# Patient Record
Sex: MALE | Race: BLACK OR AFRICAN AMERICAN | NOT HISPANIC OR LATINO | URBAN - METROPOLITAN AREA
[De-identification: names, ages, dates, MRNs, and addresses within clinical notes are randomized per-mention and may not be internally consistent; named-entity substitution may affect disease eponyms.]

---

## 2019-07-26 PROBLEM — I48.91 NEW ONSET ATRIAL FIBRILLATION: Status: ACTIVE | Noted: 2019-07-26

## 2019-07-26 PROBLEM — Z99.2 HEMODIALYSIS ACCESS, AV GRAFT: Status: ACTIVE | Noted: 2019-07-26

## 2019-07-26 PROBLEM — Z95.3 HISTORY OF MITRAL VALVE REPLACEMENT WITH BIOPROSTHETIC VALVE: Status: ACTIVE | Noted: 2019-07-26

## 2019-07-26 PROBLEM — I48.92 ATRIAL FLUTTER: Status: ACTIVE | Noted: 2019-07-26

## 2019-07-26 PROBLEM — N18.6 END STAGE RENAL DISEASE: Status: ACTIVE | Noted: 2019-07-26

## 2019-07-27 PROBLEM — R07.9 CHEST PAIN: Status: ACTIVE | Noted: 2019-07-27

## 2021-01-22 DIAGNOSIS — U07.1 COVID-19 VIRUS DETECTED: ICD-10-CM

## 2023-05-03 ENCOUNTER — TELEPHONE (OUTPATIENT)
Dept: TRANSPLANT | Facility: CLINIC | Age: 63
End: 2023-05-03

## 2023-05-04 ENCOUNTER — TELEPHONE (OUTPATIENT)
Dept: TRANSPLANT | Facility: CLINIC | Age: 63
End: 2023-05-04

## 2024-04-09 PROBLEM — D59.6 HEMOGLOBINURIA DUE TO HEMOLYSIS FROM OTHER EXTERNAL CAUSES: Status: ACTIVE | Noted: 2018-04-24

## 2024-04-09 PROBLEM — D58.9 HEMOLYTIC ANEMIA: Chronic | Status: ACTIVE | Noted: 2018-04-27

## 2024-04-09 PROBLEM — I50.32 CHRONIC DIASTOLIC (CONGESTIVE) HEART FAILURE: Status: ACTIVE | Noted: 2017-10-02

## 2024-04-09 PROBLEM — U07.1 COVID: Status: ACTIVE | Noted: 2024-04-09

## 2024-04-09 PROBLEM — R74.01 TRANSAMINITIS: Status: ACTIVE | Noted: 2018-05-20

## 2024-04-09 PROBLEM — N18.9 HISTORY OF ANEMIA DUE TO CKD: Status: ACTIVE | Noted: 2024-04-09

## 2024-04-09 PROBLEM — I50.22 CHRONIC HFREF (HEART FAILURE WITH REDUCED EJECTION FRACTION): Status: ACTIVE | Noted: 2024-04-09

## 2024-04-09 PROBLEM — Z86.2 HISTORY OF ANEMIA DUE TO CKD: Status: ACTIVE | Noted: 2024-04-09

## 2024-04-09 PROBLEM — I34.0 NONRHEUMATIC MITRAL (VALVE) INSUFFICIENCY: Status: ACTIVE | Noted: 2017-10-02

## 2024-04-09 PROBLEM — D64.9 SYMPTOMATIC ANEMIA: Status: ACTIVE | Noted: 2018-04-13

## 2024-04-12 PROBLEM — Z91.148 NONCOMPLIANCE WITH MEDICATIONS: Status: ACTIVE | Noted: 2024-04-12

## 2024-04-12 PROBLEM — I26.99 PULMONARY EMBOLISM: Status: ACTIVE | Noted: 2024-04-12

## 2024-04-13 DIAGNOSIS — U07.1 COVID-19 VIRUS DETECTED: ICD-10-CM

## 2024-05-03 ENCOUNTER — TELEPHONE (OUTPATIENT)
Dept: CARDIOLOGY | Facility: CLINIC | Age: 64
End: 2024-05-03

## 2024-05-03 NOTE — TELEPHONE ENCOUNTER
----- Message from Rosales Robin MD sent at 5/2/2024  7:34 PM CDT -----  Can you make a follow up with me for this patient?  I cardioverted him today  ----- Message -----  From: Interface, Lab In [facility]  Sent: 5/2/2024   1:43 PM CDT  To: Rosales Robin MD

## 2024-07-15 PROBLEM — I26.99 PULMONARY EMBOLISM: Status: RESOLVED | Noted: 2024-04-12 | Resolved: 2024-07-15

## 2024-09-20 ENCOUNTER — TELEPHONE (OUTPATIENT)
Dept: TRANSPLANT | Facility: CLINIC | Age: 64
End: 2024-09-20

## 2024-09-25 ENCOUNTER — DOCUMENTATION ONLY (OUTPATIENT)
Dept: TRANSPLANT | Facility: CLINIC | Age: 64
End: 2024-09-25

## 2025-07-29 ENCOUNTER — TELEPHONE (OUTPATIENT)
Dept: TRANSPLANT | Facility: CLINIC | Age: 65
End: 2025-07-29

## 2025-08-21 ENCOUNTER — DOCUMENTATION ONLY (OUTPATIENT)
Dept: TRANSPLANT | Facility: CLINIC | Age: 65
End: 2025-08-21

## 2025-08-22 ENCOUNTER — EPISODE CHANGES (OUTPATIENT)
Dept: TRANSPLANT | Facility: CLINIC | Age: 65
End: 2025-08-22

## 2025-08-25 ENCOUNTER — TELEPHONE (OUTPATIENT)
Dept: TRANSPLANT | Facility: CLINIC | Age: 65
End: 2025-08-25
Payer: MEDICARE

## 2025-09-04 ENCOUNTER — TELEPHONE (OUTPATIENT)
Dept: TRANSPLANT | Facility: CLINIC | Age: 65
End: 2025-09-04
Payer: MEDICARE